# Patient Record
Sex: FEMALE | Race: WHITE | Employment: PART TIME | URBAN - METROPOLITAN AREA
[De-identification: names, ages, dates, MRNs, and addresses within clinical notes are randomized per-mention and may not be internally consistent; named-entity substitution may affect disease eponyms.]

---

## 2019-10-28 ENCOUNTER — ANESTHESIA - HEALTHEAST (OUTPATIENT)
Dept: SURGERY | Facility: CLINIC | Age: 34
End: 2019-10-28

## 2019-10-28 ENCOUNTER — SURGERY - HEALTHEAST (OUTPATIENT)
Dept: SURGERY | Facility: CLINIC | Age: 34
End: 2019-10-28

## 2019-10-29 ASSESSMENT — MIFFLIN-ST. JEOR: SCORE: 1590.13

## 2019-10-30 ASSESSMENT — MIFFLIN-ST. JEOR: SCORE: 1572.56

## 2019-11-01 ENCOUNTER — AMBULATORY - HEALTHEAST (OUTPATIENT)
Dept: SURGERY | Facility: CLINIC | Age: 34
End: 2019-11-01

## 2019-11-01 ENCOUNTER — COMMUNICATION - HEALTHEAST (OUTPATIENT)
Dept: SURGERY | Facility: CLINIC | Age: 34
End: 2019-11-01

## 2019-11-01 DIAGNOSIS — K35.32 ACUTE APPENDICITIS WITH PERFORATION AND LOCALIZED PERITONITIS, WITHOUT ABSCESS, UNSPECIFIED WHETHER GANGRENE PRESENT: ICD-10-CM

## 2020-04-13 ENCOUNTER — APPOINTMENT (OUTPATIENT)
Dept: GENERAL RADIOLOGY | Facility: CLINIC | Age: 35
End: 2020-04-13
Attending: PHYSICIAN ASSISTANT
Payer: MEDICAID

## 2020-04-13 ENCOUNTER — APPOINTMENT (OUTPATIENT)
Dept: ULTRASOUND IMAGING | Facility: CLINIC | Age: 35
End: 2020-04-13
Attending: PHYSICIAN ASSISTANT
Payer: MEDICAID

## 2020-04-13 ENCOUNTER — HOSPITAL ENCOUNTER (EMERGENCY)
Facility: CLINIC | Age: 35
Discharge: HOME | End: 2020-04-13
Attending: PHYSICIAN ASSISTANT | Admitting: PHYSICIAN ASSISTANT
Payer: MEDICAID

## 2020-04-13 VITALS
RESPIRATION RATE: 18 BRPM | TEMPERATURE: 98.3 F | DIASTOLIC BLOOD PRESSURE: 92 MMHG | WEIGHT: 199.3 LBS | HEART RATE: 70 BPM | SYSTOLIC BLOOD PRESSURE: 146 MMHG | OXYGEN SATURATION: 98 %

## 2020-04-13 DIAGNOSIS — R05.9 COUGH: ICD-10-CM

## 2020-04-13 DIAGNOSIS — R06.00 DYSPNEA: ICD-10-CM

## 2020-04-13 DIAGNOSIS — R10.13 EPIGASTRIC ABDOMINAL PAIN: ICD-10-CM

## 2020-04-13 DIAGNOSIS — K21.9 GERD (GASTROESOPHAGEAL REFLUX DISEASE): ICD-10-CM

## 2020-04-13 LAB
ALBUMIN SERPL-MCNC: 2.9 G/DL (ref 3.4–5)
ALP SERPL-CCNC: 74 U/L (ref 40–150)
ALT SERPL W P-5'-P-CCNC: 21 U/L (ref 0–50)
ANION GAP SERPL CALCULATED.3IONS-SCNC: 4 MMOL/L (ref 3–14)
AST SERPL W P-5'-P-CCNC: 11 U/L (ref 0–45)
BASOPHILS # BLD AUTO: 0 10E9/L (ref 0–0.2)
BASOPHILS NFR BLD AUTO: 0.3 %
BILIRUB DIRECT SERPL-MCNC: <0.1 MG/DL (ref 0–0.2)
BILIRUB SERPL-MCNC: 0.2 MG/DL (ref 0.2–1.3)
BUN SERPL-MCNC: 8 MG/DL (ref 7–30)
CALCIUM SERPL-MCNC: 8.2 MG/DL (ref 8.5–10.1)
CHLORIDE SERPL-SCNC: 107 MMOL/L (ref 94–109)
CO2 SERPL-SCNC: 26 MMOL/L (ref 20–32)
CREAT SERPL-MCNC: 0.51 MG/DL (ref 0.52–1.04)
DIFFERENTIAL METHOD BLD: ABNORMAL
EOSINOPHIL # BLD AUTO: 0.3 10E9/L (ref 0–0.7)
EOSINOPHIL NFR BLD AUTO: 2.6 %
ERYTHROCYTE [DISTWIDTH] IN BLOOD BY AUTOMATED COUNT: 13.2 % (ref 10–15)
GFR SERPL CREATININE-BSD FRML MDRD: >90 ML/MIN/{1.73_M2}
GLUCOSE SERPL-MCNC: 83 MG/DL (ref 70–99)
HCT VFR BLD AUTO: 35.7 % (ref 35–47)
HGB BLD-MCNC: 11.9 G/DL (ref 11.7–15.7)
IMM GRANULOCYTES # BLD: 0 10E9/L (ref 0–0.4)
IMM GRANULOCYTES NFR BLD: 0.3 %
LIPASE SERPL-CCNC: 142 U/L (ref 73–393)
LYMPHOCYTES # BLD AUTO: 1.7 10E9/L (ref 0.8–5.3)
LYMPHOCYTES NFR BLD AUTO: 17.9 %
MCH RBC QN AUTO: 31.4 PG (ref 26.5–33)
MCHC RBC AUTO-ENTMCNC: 33.3 G/DL (ref 31.5–36.5)
MCV RBC AUTO: 94 FL (ref 78–100)
MONOCYTES # BLD AUTO: 0.6 10E9/L (ref 0–1.3)
MONOCYTES NFR BLD AUTO: 5.8 %
NEUTROPHILS # BLD AUTO: 7 10E9/L (ref 1.6–8.3)
NEUTROPHILS NFR BLD AUTO: 73.1 %
NRBC # BLD AUTO: 0 10*3/UL
NRBC BLD AUTO-RTO: 0 /100
NT-PROBNP SERPL-MCNC: 50 PG/ML (ref 0–450)
PLATELET # BLD AUTO: 226 10E9/L (ref 150–450)
POTASSIUM SERPL-SCNC: 3.4 MMOL/L (ref 3.4–5.3)
PROT SERPL-MCNC: 6.5 G/DL (ref 6.8–8.8)
RBC # BLD AUTO: 3.79 10E12/L (ref 3.8–5.2)
SODIUM SERPL-SCNC: 137 MMOL/L (ref 133–144)
TROPONIN I SERPL-MCNC: <0.015 UG/L (ref 0–0.04)
WBC # BLD AUTO: 9.6 10E9/L (ref 4–11)

## 2020-04-13 PROCEDURE — 99285 EMERGENCY DEPT VISIT HI MDM: CPT | Mod: 25

## 2020-04-13 PROCEDURE — 71045 X-RAY EXAM CHEST 1 VIEW: CPT

## 2020-04-13 PROCEDURE — 96360 HYDRATION IV INFUSION INIT: CPT

## 2020-04-13 PROCEDURE — 83690 ASSAY OF LIPASE: CPT | Performed by: PHYSICIAN ASSISTANT

## 2020-04-13 PROCEDURE — 96361 HYDRATE IV INFUSION ADD-ON: CPT

## 2020-04-13 PROCEDURE — 83880 ASSAY OF NATRIURETIC PEPTIDE: CPT | Performed by: PHYSICIAN ASSISTANT

## 2020-04-13 PROCEDURE — 85025 COMPLETE CBC W/AUTO DIFF WBC: CPT | Performed by: PHYSICIAN ASSISTANT

## 2020-04-13 PROCEDURE — 80048 BASIC METABOLIC PNL TOTAL CA: CPT | Performed by: PHYSICIAN ASSISTANT

## 2020-04-13 PROCEDURE — 25800030 ZZH RX IP 258 OP 636: Performed by: PHYSICIAN ASSISTANT

## 2020-04-13 PROCEDURE — 84484 ASSAY OF TROPONIN QUANT: CPT | Performed by: PHYSICIAN ASSISTANT

## 2020-04-13 PROCEDURE — 80076 HEPATIC FUNCTION PANEL: CPT | Performed by: PHYSICIAN ASSISTANT

## 2020-04-13 PROCEDURE — 25000132 ZZH RX MED GY IP 250 OP 250 PS 637: Performed by: PHYSICIAN ASSISTANT

## 2020-04-13 PROCEDURE — 76705 ECHO EXAM OF ABDOMEN: CPT

## 2020-04-13 PROCEDURE — 93005 ELECTROCARDIOGRAM TRACING: CPT

## 2020-04-13 RX ORDER — SUCRALFATE ORAL 1 G/10ML
1 SUSPENSION ORAL ONCE
Status: COMPLETED | OUTPATIENT
Start: 2020-04-13 | End: 2020-04-13

## 2020-04-13 RX ORDER — CIMETIDINE 400 MG
400 TABLET ORAL 2 TIMES DAILY
Qty: 14 TABLET | Refills: 0 | Status: SHIPPED | OUTPATIENT
Start: 2020-04-13 | End: 2020-04-20

## 2020-04-13 RX ORDER — ONDANSETRON 2 MG/ML
4 INJECTION INTRAMUSCULAR; INTRAVENOUS ONCE
Status: DISCONTINUED | OUTPATIENT
Start: 2020-04-13 | End: 2020-04-13 | Stop reason: HOSPADM

## 2020-04-13 RX ORDER — SUCRALFATE ORAL 1 G/10ML
1 SUSPENSION ORAL 4 TIMES DAILY PRN
Qty: 420 ML | Refills: 0 | Status: SHIPPED | OUTPATIENT
Start: 2020-04-13 | End: 2020-04-18

## 2020-04-13 RX ADMIN — SODIUM CHLORIDE 1000 ML: 9 INJECTION, SOLUTION INTRAVENOUS at 16:12

## 2020-04-13 RX ADMIN — SUCRALFATE 1 G: 1 SUSPENSION ORAL at 16:47

## 2020-04-13 ASSESSMENT — ENCOUNTER SYMPTOMS
COUGH: 1
SHORTNESS OF BREATH: 1
DIARRHEA: 0
CONSTIPATION: 0
DYSURIA: 0
MYALGIAS: 0
VOMITING: 1
FEVER: 0
ABDOMINAL PAIN: 1
HEMATURIA: 0
DIZZINESS: 1

## 2020-04-13 NOTE — LETTER
April 13, 2020      To Whom It May Concern:      Gladys King was seen in our Emergency Department today, 04/13/20.  I expect her condition to improve over the next 3-7 days.  She may return to work when 3 days without fever.    Sincerely,        Alonso Castellano PA-C

## 2020-04-13 NOTE — ED PROVIDER NOTES
History     Chief Complaint:  Abdominal Pain and Dizziness    RICKY King is a 34 year old female at 20 weeks gestation with a history of anxiety and an appendectomy who presents to the emergency department for evaluation of right upper quadrant and epigastric abdominal pain. She developed this pain a few days ago and was initially intermittent but today became more constant. There is no clear association between PO intake and worse pain. She vomited a few days ago but otherwise has not been vomiting since her first trimester. The patient complains of a reflux type pain in her chest which is worse while lying down. She denies any diarrhea or blood in her stool.  She also denies any vaginal bleeding or discharge as well as any urinary symptoms. She has also felt slightly dizzy which she attributes to dehydration    The patient also complains of some shortness of breath intermittently. She notes non-productive cough.  No fever.  No dysuria.  She recently quit smoking but has had a few slips ups which result in coughing and increased shortness of breath. She denies any swelling or pain in her legs. The patient has no history of clots or heart conditions.  No active cancer.  No hemoptysis.  No recent surgeries or hospitalizations.      Allergies:  No known drug allergies    Medications:    Albuterol  Valium  Lexapro  Chantix    Past Medical History:    BLANQUITA III with severe dysplasia  borderline personality disorder  Depression  ASCUS  Anxiety     Past Surgical History:    Excise lump breast  LEEP procedure    Family History:    MI  Breast cancer    Social History:  The patient arrives alone  Smoking: current 1.0 ppd, tried to quit 2018  Alcohol use: yes, 5-6 beers few times a month  PCP: Lynnette Llamas  Marital Status: Single [1]      Review of Systems   Constitutional: Negative for fever.   Respiratory: Positive for cough and shortness of breath.    Cardiovascular: Negative for chest pain and leg swelling.    Gastrointestinal: Positive for abdominal pain and vomiting. Negative for constipation and diarrhea.   Genitourinary: Negative for dysuria, hematuria, vaginal bleeding and vaginal discharge.   Musculoskeletal: Negative for myalgias.   Neurological: Positive for dizziness.   All other systems reviewed and are negative.      Physical Exam     Patient Vitals for the past 24 hrs:   BP Temp Pulse Resp SpO2 Weight   04/13/20 1715 -- -- -- -- 98 % --   04/13/20 1710 -- -- -- -- 98 % --   04/13/20 1700 (!) 146/92 -- 70 -- 92 % --   04/13/20 1650 -- -- -- -- 100 % --   04/13/20 1640 129/88 -- 77 -- 100 % --   04/13/20 1630 (!) 131/92 -- -- -- 99 % --   04/13/20 1501 133/84 98.3  F (36.8  C) 95 18 99 % 90.4 kg (199 lb 4.7 oz)     Physical Exam  General: Alert and cooperative with exam. Resting comfortably on gurney  Head:  Scalp is NC/AT  Eyes:  No scleral icterus, PERRL  ENT:  The external nose and ears are normal.   Neck:  Normal range of motion without rigidity.  CV:  Regular rate and rhythm    No pathologic murmur, rubs, or gallops.  Resp:  Breath sounds are clear bilaterally.  No crackles, wheezes, rhonchi, stridor.    Non-labored, no retractions or accessory muscle use  GI:  Abdomen is soft, no distension, mild epigastric and ruq tenderness, no masses. No peritoneal signs.  Bowel sounds present in all quadrants  :  No suprapubic or flank tenderness  MS:  No lower extremity edema or asymmetric calf swelling.  Skin:  Warm and dry, No rash or lesions noted.  Neuro: Oriented. No gross motor deficits.    Strength and sensation grossly intact in all 4 extremities.  Cranial nerves 2-12 intact. GCS: 15. Normal finger to nose and heel to shin testing.  Gait normal  Psych: Awake. Alert. Normal affect. Appropriate interactions.      Emergency Department Course     ECG:  ECG taken at 1615, ECG read at 1617  Sinus rhythm with sinus arrhythmia  normal ECG  Rate 79bpm. IL interval 134ms. QRS duration 82 ms. QT/QTc 396/454 ms. P-R-T  axes 46 51 39.    Imaging:  Radiology findings were communicated with the patient who voiced understanding of the findings.    XR Chest Port 1 view   IMPRESSION: Unremarkable single view of the chest  Reading per radiology    US Abdomen limited   IMPRESSION:   1.  Normal limited abdominal ultrasound  Reading per radiology    Laboratory:  Laboratory findings were communicated with the patient who voiced understanding of the findings.    CBC:  o/w WNL. (WBC 9.6, HGB 11.9, )   BMP: Glucose 83, o/w WNL (Creatinine: 0.51 (L))    Lipase: 142  BNP: 50    Troponin (Collected 1612): <0.015    Hepatic Panel: albumin 2.9 (H), protein total 6.5 (L) o/w WNL    Interventions:  1612 NS 1L IV Bolus  1613 Zofran 4mg IV injection   1647 Carafate 1 g PO    Emergency Department Course:  Past medical records, nursing notes, and vitals reviewed.  1525: I performed an exam of the patient and obtained history, as documented above.     IV was inserted and blood was drawn for laboratory testing, results above.  The patient was sent for an US and XR while in the emergency department, findings above    1749: I rechecked the patient. Explained findings to patient.  Carafate improved symptoms of GI.    I personally reviewed the laboratory and imaging results with the Patient and answered all related questions prior to discharge.     Findings and plan explained to the Patient. Patient discharged home with instructions regarding supportive care, medications, and reasons to return. The importance of close follow-up was reviewed.   Impression & Plan      Medical Decision Making:  Gladys King is a 34 year old female who presents to the emergency department today for evaluation of multiple symptoms as above.  Extensive workup today reassuring.  RUQ without evidence of gallbladder pathology.  Labs show normal WBC, hgb, lfts, lipase, kidney function, no concerning electrolyte derangements.  Abdominal examination otherwise benign and low  suspicion for other intraabdominal catastrophe at this time such as perforation, abscess, obstruction, appendicitis, ovarian torsion, PID, etc.  No urinary symptoms, fever, or leukocytosis.  No lower abdominal pain or vaginal bleeding to raise concern for obstetric catastrophe.  Fetal heart tones reassuring.    Also endorses some SOB, and cough.  Most likely related to viral respiratory syndrome.  EKG normal, troponin undetectable and doubt ACS.  BNP normal and clinical evidence of CHF.  Chest x-ray clear.  Considered PE given pregnancy but no tachycardia, hypoxia and otherwise falls into PERC negative category.  We discussed that cannot be used with certainty to rule out PE in pregnancy but after discussion of risks and benefits will forgo further testing at this time given low suspicion.  Considered aortic dissection but feel this is very unlikely at this time.  No evidence of pre-eclampsia and 1 BP reading of 131/92 however two other readings both in normal range.  COVID-19 would be in differential but does not qualify for testing at present time.    Suspect GI symptoms secondary to GERD given improvement with carafate and otherwise reassuring workup.  Will prescribe this and H2 blocker.  Given quarantine guidelines for potential COVID=19.  Call OBGYN tomorrow for follow-up.  Strict return precautions for new or worsening symptoms.    Diagnosis:    ICD-10-CM   1. Epigastric abdominal pain  R10.13   2. Dyspnea  R06.00   3. Cough  R05   4. GERD (gastroesophageal reflux disease)  K21.9       Disposition:   discharged to home    Discharge Medications:     Medication List      Started    cimetidine 400 MG tablet  Commonly known as:  TAGAMET  400 mg, Oral, 2 TIMES DAILY     sucralfate 1 GM/10ML suspension  Commonly known as:  CARAFATE  1 g, Oral, 4 TIMES DAILY PRN            Scribe Disclosure:  Shira MANZANO, am serving as a scribe at 3:25 PM on 4/13/2020 to document services personally performed by Alonso Castellano  LINDA Dietrich based on my observations and the provider's statements to me.    [facility] EMERGENCY DEPARTMENT       Alonso Castellano PA-C  04/14/20 0188

## 2020-04-13 NOTE — ED AVS SNAPSHOT
[facility] Emergency Department  [address]  [facility] [address]  Phone:  [phone]  Fax:  [phone]                                    Gladys King   MRN: [id number]    Department:  [facility] Emergency Department   Date of Visit:  4/13/2020           After Visit Summary Signature Page    I have received my discharge instructions, and my questions have been answered. I have discussed any challenges I see with this plan with the nurse or doctor.    ..........................................................................................................................................  Patient/Patient Representative Signature      ..........................................................................................................................................  Patient Representative Print Name and Relationship to Patient    ..................................................               ................................................  Date                                   Time    ..........................................................................................................................................  Reviewed by Signature/Title    ...................................................              ..............................................  Date                                               Time          22EPIC Rev 08/18

## 2020-04-13 NOTE — ED TRIAGE NOTES
Pt 20 weeks pregnant. Friday started having intermittent RUQ abdominal pain lasting an hour at a time. Today has been consistent. Pt also started feeling dizzy yesterday and today as well. No fever. Slight infrequent cough noted but pt states she recently quit smoking. Pt states she is a little short of breath with laying on her back. Pt sees SARIKA Gatica out of [place] and they sent here to be checked out. Per pt, OBGYN not concerned about baby. Vomited one time on Saturday. No nausea or diarrhea at this time. Does feel acid in the back of throat and esophagus.

## 2020-04-14 LAB — INTERPRETATION ECG - MUSE: NORMAL

## 2020-04-14 NOTE — DISCHARGE INSTRUCTIONS
Discharge Instructions  Abdominal Pain    Abdominal pain (belly pain) can be caused by many things. Your evaluation today does not show the exact cause for your pain. Your provider today has decided that it is unlikely your pain is due to a life threatening problem, or a problem requiring surgery or hospital admission. Sometimes those problems cannot be found right away, so it is very important that you follow up as directed.  Sometimes only the changes which occur over time allow the cause of your pain to be found.    Generally, every Emergency Department visit should have a follow-up clinic visit with either a primary or a specialty clinic/provider. Please follow-up as instructed by your emergency provider today. With abdominal pain, we often recommend very close follow-up, such as the following day.    ADULTS:  Return to the Emergency Department right away if:    You get an oral temperature above 102oF or as directed by your provider.  You have blood in your stools. This may be bright red or appear as black, tarry stools.    You keep vomiting (throwing up) or cannot drink liquids.  You see blood when you vomit.   You cannot have a bowel movement or you cannot pass gas.  Your stomach gets bloated or bigger.  Your skin or the whites of your eyes look yellow.  You faint.  You have bloody, frequent or painful urination (peeing).  You have new symptoms or anything that worries you.    CHILDREN:  Return to the Emergency Department right away if your child has any of the above-listed symptoms or the following:    Pushes your hand away or screams/cries when his/her belly is touched.  You notice your child is very fussy or weak.  Your child is very tired and is too tired to eat or drink.  Your child is dehydrated.  Signs of dehydration can be:  Significant change in the amount of wet diapers/urine.  Your infant or child starts to have dry mouth and lips, or no saliva (spit) or tears.    PREGNANT WOMEN:  Return to the  Emergency Department right away if you have any of the above-listed symptoms or the following:    You have bleeding, leaking fluid or passing tissue from the vagina.  You have worse pain or cramping, or pain in your shoulder or back.  You have vomiting that will not stop.  You have a temperature of 100oF or more.  Your baby is not moving as much as usual.  You faint.  You get a bad headache with or without eye problems and abdominal pain.  You have a seizure.  You have unusual discharge from your vagina and abdominal pain.    Abdominal pain is pretty common during pregnancy.  Your pain may or may not be related to your pregnancy. You should follow-up closely with your OB provider so they can evaluate you and your baby.  Until you follow-up with your regular provider, do the following:     Avoid sex and do not put anything in your vagina.  Drink clear fluids.  Only take medications approved by your provider.    MORE INFORMATION:    Appendicitis:  A possible cause of abdominal pain in any person who still has their appendix is acute appendicitis. Appendicitis is often hard to diagnose.  Testing does not always rule out early appendicitis or other causes of abdominal pain. Close follow-up with your provider and re-evaluations may be needed to figure out the reason for your abdominal pain.    Follow-up:  It is very important that you make an appointment with your clinic and go to the appointment.  If you do not follow-up with your primary provider, it may result in missing an important development which could result in permanent injury or disability and/or lasting pain.  If there is any problem keeping your appointment, call your provider or return to the Emergency Department.    Medications:  Take your medications as directed by your provider today.  Before using over-the-counter medications, ask your provider and make sure to take the medications as directed.  If you have any questions about medications, ask your  "provider.    Diet:  Resume your normal diet as much as possible, but do not eat fried, fatty or spicy foods while you have pain.  Do not drink alcohol or have caffeine.  Do not smoke tobacco.    Probiotics: If you have been given an antibiotic, you may want to also take a probiotic pill or eat yogurt with live cultures. Probiotics have \"good bacteria\" to help your intestines stay healthy. Studies have shown that probiotics help prevent diarrhea (loose stools) and other intestine problems (including C. diff infection) when you take antibiotics. You can buy these without a prescription in the pharmacy section of the store.     If you were given a prescription for medicine here today, be sure to read all of the information (including the package insert) that comes with your prescription.  This will include important information about the medicine, its side effects, and any warnings that you need to know about.  The pharmacist who fills the prescription can provide more information and answer questions you may have about the medicine.  If you have questions or concerns that the pharmacist cannot address, please call or return to the Emergency Department.       Remember that you can always come back to the Emergency Department if you are not able to see your regular provider in the amount of time listed above, if you get any new symptoms, or if there is anything that worries you.  Discharge Instructions  COVID-19    Your Provider has determined that you should practice self-isolation and self-monitoring in order to protect yourself and your community from COVID-19, which is the disease caused by a new coronavirus. The virus spreads from person to person primarily by droplets when an infected person coughs or sneezes and the droplet either lands on another person or that other person touches a surface with the droplet on it. Diagnosis of COVID-19 can be made with a test but many times the test is unavailable or not " necessary. There is no specific treatment or medicine for the disease.    Symptoms of COVID-19  Many people have no symptoms or mild symptoms.  Symptoms may usually appear 4 to 5 days (up to 14 days) after contact with another ill person. Some people will get severe symptoms and pneumonia. Usual symptoms are:     Fever    Cough   Trouble breathing   Less common symptoms are: Headache, body aches, sore    throat, sneezing, diarrhea.    How to Care for Yourself    Stay home.  Most people will recover from illness with mild symptoms.  Isolation by staying home is the best method to prevent the spread of the illness. Do not go to work or school. Have a friend or relative do your shopping. Do not use public transportation (bus, train) or ridesharing (Lyft, Uber).    How long should I stay home?  If you have symptoms of a respiratory disease (fever, cough), you should stay home for at least 7 days, and for 3 days with no fever and improvement of respiratory symptoms--whichever is longer. (Your fever should be gone for 3 days without using fever-reducing medicine.)    For example, if you have a fever and coughing for 4 days, you need to stay home 3 more days with no fever for a total of 7 days. Or, if you have a fever and coughing for 5 days, you need to stay home 3 more days with no fever for a total of 8 days.    Separate yourself from other people in your home.?As much as possible, you should stay in one room and away from other people in your home. Also, use a separate bathroom, if possible. Avoid handling pets or other animals while sick.     Wear a facemask if you need to be around other people and cover your mouth and nose with a tissue when you cough or sneeze.     Avoid sharing personal household items. You should not share dishes, drinking glasses, forks/knives/spoons, towels, or bedding with other people in your home. After using these items, they should be washed with soap and water. Clean parts of your home  that are touched often (doorknobs, faucets, countertops, etc.) daily.     Wash your hands often with soap and water for at least 20 seconds or use an alcohol-based hand  containing at least 60% alcohol.     Avoid touching your face.    Treat your symptoms: Take Acetaminophen (Tylenol) to treat body aches and fever as needed for comfort. Ibuprofen (Advil or Motrin) can be used as well if you still have symptoms after taking Tylenol.  Drink fluids. Rest.    Watch for worsening symptoms, shortness of breath, or difficulty   breathing.    Return to the Emergency Department if:    If you are developing worsening breathing, shortness of breath, or feel worse you should seek medical attention.  If you are uncertain, contact your health care provider/clinic. If you need emergency medical attention, call 911 and tell them you have been ill.

## 2021-06-02 NOTE — TELEPHONE ENCOUNTER
Pt in for FABI drain removal today. Reports improved pain immediately after removal. She has been taking Dilaudid at bedtime to help with sleep. She is alternating between Advil and Tylenol q 6-8 hours during the day for pain.     She has asked for an additional small refill. Informed her I would ask Dr Salazar and let her know if this is possible.

## 2021-06-02 NOTE — ANESTHESIA PREPROCEDURE EVALUATION
Anesthesia Evaluation      Patient summary reviewed     Airway   Mallampati: I  Neck ROM: full   Pulmonary - normal exam    breath sounds clear to auscultation  (+) a smoker                         Cardiovascular - negative ROS and normal exam  Rhythm: regular  Rate: normal,         Neuro/Psych - negative ROS     Endo/Other - negative ROS      GI/Hepatic/Renal - negative ROS           Dental - normal exam                        Anesthesia Plan  Planned anesthetic: general endotracheal    ASA 2 - emergent   Induction: intravenous   Anesthetic plan and risks discussed with: patient  Anesthesia plan special considerations: rapid sequence induction,   Post-op plan: routine recovery

## 2021-06-02 NOTE — PROGRESS NOTES
Pt came into clinic today for a drain removal. She is s/p laparoscopic appendectomy on 10/28/19. Overall doing well. She called earlier today reporting that her drain was hardly draining any longer. Her output the past several days has averaged slightly less than 20 mL per day. Today there is about 10 mL.    Discussed  with Dr. Saleh today and received verbal order for removal.     Pt tolerated procedure well. Bacitracin applied and site covered with a band- aid.    She was instructed to call if she has any further questions or concerns.

## 2021-06-02 NOTE — ANESTHESIA CARE TRANSFER NOTE
Last vitals:   Vitals:    10/28/19 1653   BP: (!) 158/96   Pulse: (!) 102   Resp: 16   Temp: 36.8  C (98.2  F)   SpO2: 97%     Patient's level of consciousness is drowsy  Spontaneous respirations: yes  Maintains airway independently: yes  Dentition unchanged: yes  Oropharynx: oropharynx clear of all foreign objects    QCDR Measures:  ASA# 20 - Surgical Safety Checklist: WHO surgical safety checklist completed prior to induction    PQRS# 430 - Adult PONV Prevention: 4558F - Pt received => 2 anti-emetic agents (different classes) preop & intraop  ASA# 8 - Peds PONV Prevention: NA - Not pediatric patient, not GA or 2 or more risk factors NOT present  PQRS# 424 - Avis-op Temp Management: 4559F - At least one body temp DOCUMENTED => 35.5C or 95.9F within required timeframe  PQRS# 426 - PACU Transfer Protocol: - Transfer of care checklist used  ASA# 14 - Acute Post-op Pain: ASA14B - Patient did NOT experience pain >= 7 out of 10

## 2021-06-02 NOTE — ANESTHESIA POSTPROCEDURE EVALUATION
Patient: Gladys King  APPENDECTOMY, LAPAROSCOPIC  Anesthesia type: general    Patient location: PACU  Last vitals:   Vitals Value Taken Time   /83 10/28/2019  6:25 PM   Temp 36.6  C (97.9  F) 10/28/2019  6:25 PM   Pulse 92 10/28/2019  6:25 PM   Resp 20 10/28/2019  6:25 PM   SpO2 92 % 10/28/2019  6:25 PM     Post vital signs: stable  Level of consciousness: awake and responds to simple questions  Post-anesthesia pain: pain controlled  Post-anesthesia nausea and vomiting: no  Pulmonary: unassisted, return to baseline  Cardiovascular: stable and blood pressure at baseline  Hydration: adequate  Anesthetic events: no    QCDR Measures:  ASA# 11 - Avis-op Cardiac Arrest: ASA11B - Patient did NOT experience unanticipated cardiac arrest  ASA# 12 - Avis-op Mortality Rate: ASA12B - Patient did NOT die  ASA# 13 - PACU Re-Intubation Rate: ASA13B - Patient did NOT require a new airway mgmt  ASA# 10 - Composite Anes Safety: ASA10A - No serious adverse event    Additional Notes:

## 2021-06-03 VITALS — WEIGHT: 187.38 LBS | HEIGHT: 67 IN | BODY MASS INDEX: 29.41 KG/M2

## 2021-06-16 PROBLEM — K35.30 ACUTE APPENDICITIS WITH LOCALIZED PERITONITIS, UNSPECIFIED WHETHER ABSCESS PRESENT, UNSPECIFIED WHETHER GANGRENE PRESENT, UNSPECIFIED WHETHER PERFORATION PRESENT: Status: ACTIVE | Noted: 2019-10-28

## 2021-06-16 PROBLEM — K37 APPENDICITIS: Status: ACTIVE | Noted: 2019-10-28

## 2024-06-25 ENCOUNTER — LAB REQUISITION (OUTPATIENT)
Dept: LAB | Facility: CLINIC | Age: 39
End: 2024-06-25
Payer: COMMERCIAL

## 2024-06-25 DIAGNOSIS — N39.3 STRESS INCONTINENCE (FEMALE) (MALE): ICD-10-CM

## 2024-06-25 DIAGNOSIS — N87.0 MILD CERVICAL DYSPLASIA: ICD-10-CM

## 2024-06-25 PROCEDURE — 87624 HPV HI-RISK TYP POOLED RSLT: CPT | Performed by: OBSTETRICS & GYNECOLOGY

## 2024-06-25 PROCEDURE — G0145 SCR C/V CYTO,THINLAYER,RESCR: HCPCS | Performed by: OBSTETRICS & GYNECOLOGY

## 2024-06-25 PROCEDURE — 87086 URINE CULTURE/COLONY COUNT: CPT | Performed by: OBSTETRICS & GYNECOLOGY

## 2024-06-26 LAB
HPV HR 12 DNA CVX QL NAA+PROBE: NEGATIVE
HPV16 DNA CVX QL NAA+PROBE: NEGATIVE
HPV18 DNA CVX QL NAA+PROBE: NEGATIVE
HUMAN PAPILLOMA VIRUS FINAL DIAGNOSIS: NORMAL

## 2024-06-27 LAB — BACTERIA UR CULT: NORMAL

## 2024-06-30 LAB
BKR LAB AP GYN ADEQUACY: NORMAL
BKR LAB AP GYN INTERPRETATION: NORMAL
PATH REPORT.COMMENTS IMP SPEC: NORMAL
PATH REPORT.COMMENTS IMP SPEC: NORMAL

## 2024-09-19 ENCOUNTER — LAB REQUISITION (OUTPATIENT)
Dept: LAB | Facility: CLINIC | Age: 39
End: 2024-09-19
Payer: COMMERCIAL

## 2024-09-19 DIAGNOSIS — N39.46 MIXED INCONTINENCE: ICD-10-CM

## 2024-09-19 PROCEDURE — 87086 URINE CULTURE/COLONY COUNT: CPT | Performed by: OBSTETRICS & GYNECOLOGY

## 2024-09-20 LAB — BACTERIA UR CULT: NORMAL

## 2025-06-10 RX ORDER — OMALIZUMAB 150 MG/ML
INJECTION, SOLUTION SUBCUTANEOUS
Qty: 1 ML | Refills: 1 | OUTPATIENT
Start: 2025-06-10

## 2025-06-20 RX ORDER — OMALIZUMAB 150 MG/ML
INJECTION, SOLUTION SUBCUTANEOUS
Qty: 1 ML | Refills: 1 | OUTPATIENT
Start: 2025-06-20